# Patient Record
Sex: FEMALE | Race: WHITE | URBAN - METROPOLITAN AREA
[De-identification: names, ages, dates, MRNs, and addresses within clinical notes are randomized per-mention and may not be internally consistent; named-entity substitution may affect disease eponyms.]

---

## 2017-12-31 ENCOUNTER — INPATIENT (INPATIENT)
Facility: HOSPITAL | Age: 82
LOS: 1 days | Discharge: HOME | DRG: 149 | End: 2018-01-02
Attending: INTERNAL MEDICINE | Admitting: INTERNAL MEDICINE
Payer: MEDICARE

## 2017-12-31 VITALS
DIASTOLIC BLOOD PRESSURE: 76 MMHG | OXYGEN SATURATION: 96 % | SYSTOLIC BLOOD PRESSURE: 147 MMHG | TEMPERATURE: 97 F | HEART RATE: 67 BPM | RESPIRATION RATE: 18 BRPM

## 2017-12-31 DIAGNOSIS — Z90.49 ACQUIRED ABSENCE OF OTHER SPECIFIED PARTS OF DIGESTIVE TRACT: Chronic | ICD-10-CM

## 2017-12-31 DIAGNOSIS — R63.8 OTHER SYMPTOMS AND SIGNS CONCERNING FOOD AND FLUID INTAKE: ICD-10-CM

## 2017-12-31 DIAGNOSIS — R42 DIZZINESS AND GIDDINESS: ICD-10-CM

## 2017-12-31 DIAGNOSIS — Z29.9 ENCOUNTER FOR PROPHYLACTIC MEASURES, UNSPECIFIED: ICD-10-CM

## 2017-12-31 DIAGNOSIS — Z98.890 OTHER SPECIFIED POSTPROCEDURAL STATES: Chronic | ICD-10-CM

## 2017-12-31 DIAGNOSIS — I67.1 CEREBRAL ANEURYSM, NONRUPTURED: ICD-10-CM

## 2017-12-31 LAB
ALBUMIN SERPL ELPH-MCNC: 4.4 G/DL — SIGNIFICANT CHANGE UP (ref 3.3–5)
ALP SERPL-CCNC: 49 U/L — SIGNIFICANT CHANGE UP (ref 40–120)
ALT FLD-CCNC: 13 U/L — SIGNIFICANT CHANGE UP (ref 10–45)
ANION GAP SERPL CALC-SCNC: 16 MMOL/L — SIGNIFICANT CHANGE UP (ref 5–17)
APPEARANCE UR: CLEAR — SIGNIFICANT CHANGE UP
APTT BLD: 38.9 SEC — HIGH (ref 27.5–37.4)
AST SERPL-CCNC: 14 U/L — SIGNIFICANT CHANGE UP (ref 10–40)
BACTERIA # UR AUTO: PRESENT /HPF
BASOPHILS NFR BLD AUTO: 0.3 % — SIGNIFICANT CHANGE UP (ref 0–2)
BILIRUB SERPL-MCNC: 1.8 MG/DL — HIGH (ref 0.2–1.2)
BILIRUB UR-MCNC: NEGATIVE — SIGNIFICANT CHANGE UP
BUN SERPL-MCNC: 18 MG/DL — SIGNIFICANT CHANGE UP (ref 7–23)
CALCIUM SERPL-MCNC: 9.8 MG/DL — SIGNIFICANT CHANGE UP (ref 8.4–10.5)
CHLORIDE SERPL-SCNC: 98 MMOL/L — SIGNIFICANT CHANGE UP (ref 96–108)
CHOLEST SERPL-MCNC: 227 MG/DL — HIGH (ref 10–199)
CO2 SERPL-SCNC: 24 MMOL/L — SIGNIFICANT CHANGE UP (ref 22–31)
COLOR SPEC: YELLOW — SIGNIFICANT CHANGE UP
CREAT SERPL-MCNC: 0.72 MG/DL — SIGNIFICANT CHANGE UP (ref 0.5–1.3)
DIFF PNL FLD: (no result)
EOSINOPHIL NFR BLD AUTO: 1.8 % — SIGNIFICANT CHANGE UP (ref 0–6)
EPI CELLS # UR: SIGNIFICANT CHANGE UP /HPF (ref 0–5)
GLUCOSE SERPL-MCNC: 135 MG/DL — HIGH (ref 70–99)
GLUCOSE UR QL: NEGATIVE — SIGNIFICANT CHANGE UP
HCT VFR BLD CALC: 42.5 % — SIGNIFICANT CHANGE UP (ref 34.5–45)
HDLC SERPL-MCNC: 62 MG/DL — SIGNIFICANT CHANGE UP (ref 40–125)
HGB BLD-MCNC: 14.4 G/DL — SIGNIFICANT CHANGE UP (ref 11.5–15.5)
INR BLD: 1.02 — SIGNIFICANT CHANGE UP (ref 0.88–1.16)
KETONES UR-MCNC: 15 MG/DL
LEUKOCYTE ESTERASE UR-ACNC: (no result)
LIPID PNL WITH DIRECT LDL SERPL: 139 MG/DL — HIGH
LYMPHOCYTES # BLD AUTO: 29.5 % — SIGNIFICANT CHANGE UP (ref 13–44)
MCHC RBC-ENTMCNC: 30.3 PG — SIGNIFICANT CHANGE UP (ref 27–34)
MCHC RBC-ENTMCNC: 33.9 G/DL — SIGNIFICANT CHANGE UP (ref 32–36)
MCV RBC AUTO: 89.3 FL — SIGNIFICANT CHANGE UP (ref 80–100)
MONOCYTES NFR BLD AUTO: 5.2 % — SIGNIFICANT CHANGE UP (ref 2–14)
NEUTROPHILS NFR BLD AUTO: 63.2 % — SIGNIFICANT CHANGE UP (ref 43–77)
NITRITE UR-MCNC: NEGATIVE — SIGNIFICANT CHANGE UP
PH UR: 7.5 — SIGNIFICANT CHANGE UP (ref 5–8)
PLATELET # BLD AUTO: 219 K/UL — SIGNIFICANT CHANGE UP (ref 150–400)
POTASSIUM SERPL-MCNC: 3.4 MMOL/L — LOW (ref 3.5–5.3)
POTASSIUM SERPL-SCNC: 3.4 MMOL/L — LOW (ref 3.5–5.3)
PROT SERPL-MCNC: 6.7 G/DL — SIGNIFICANT CHANGE UP (ref 6–8.3)
PROT UR-MCNC: NEGATIVE MG/DL — SIGNIFICANT CHANGE UP
PROTHROM AB SERPL-ACNC: 11.3 SEC — SIGNIFICANT CHANGE UP (ref 9.8–12.7)
RBC # BLD: 4.76 M/UL — SIGNIFICANT CHANGE UP (ref 3.8–5.2)
RBC # FLD: 13.9 % — SIGNIFICANT CHANGE UP (ref 10.3–16.9)
RBC CASTS # UR COMP ASSIST: < 5 /HPF — SIGNIFICANT CHANGE UP
SODIUM SERPL-SCNC: 138 MMOL/L — SIGNIFICANT CHANGE UP (ref 135–145)
SP GR SPEC: 1.01 — SIGNIFICANT CHANGE UP (ref 1–1.03)
TOTAL CHOLESTEROL/HDL RATIO MEASUREMENT: 3.7 RATIO — SIGNIFICANT CHANGE UP (ref 3.3–7.1)
TRIGL SERPL-MCNC: 129 MG/DL — SIGNIFICANT CHANGE UP (ref 10–149)
TROPONIN T SERPL-MCNC: <0.01 NG/ML — SIGNIFICANT CHANGE UP (ref 0–0.01)
UROBILINOGEN FLD QL: 0.2 E.U./DL — SIGNIFICANT CHANGE UP
WBC # BLD: 10.2 K/UL — SIGNIFICANT CHANGE UP (ref 3.8–10.5)
WBC # FLD AUTO: 10.2 K/UL — SIGNIFICANT CHANGE UP (ref 3.8–10.5)
WBC UR QL: < 5 /HPF — SIGNIFICANT CHANGE UP

## 2017-12-31 PROCEDURE — 70496 CT ANGIOGRAPHY HEAD: CPT | Mod: 26

## 2017-12-31 PROCEDURE — 93010 ELECTROCARDIOGRAM REPORT: CPT

## 2017-12-31 PROCEDURE — 99223 1ST HOSP IP/OBS HIGH 75: CPT | Mod: GC

## 2017-12-31 PROCEDURE — 99291 CRITICAL CARE FIRST HOUR: CPT | Mod: 25

## 2017-12-31 PROCEDURE — 70498 CT ANGIOGRAPHY NECK: CPT | Mod: 26

## 2017-12-31 RX ORDER — DIAZEPAM 5 MG
2 TABLET ORAL ONCE
Refills: 0 | Status: DISCONTINUED | OUTPATIENT
Start: 2017-12-31 | End: 2017-12-31

## 2017-12-31 RX ORDER — HEPARIN SODIUM 5000 [USP'U]/ML
5000 INJECTION INTRAVENOUS; SUBCUTANEOUS EVERY 12 HOURS
Refills: 0 | Status: DISCONTINUED | OUTPATIENT
Start: 2017-12-31 | End: 2018-01-02

## 2017-12-31 RX ORDER — DIAZEPAM 5 MG
5 TABLET ORAL ONCE
Refills: 0 | Status: DISCONTINUED | OUTPATIENT
Start: 2017-12-31 | End: 2017-12-31

## 2017-12-31 RX ORDER — ONDANSETRON 8 MG/1
4 TABLET, FILM COATED ORAL ONCE
Refills: 0 | Status: COMPLETED | OUTPATIENT
Start: 2017-12-31 | End: 2017-12-31

## 2017-12-31 RX ORDER — SODIUM CHLORIDE 9 MG/ML
1000 INJECTION INTRAMUSCULAR; INTRAVENOUS; SUBCUTANEOUS ONCE
Refills: 0 | Status: COMPLETED | OUTPATIENT
Start: 2017-12-31 | End: 2017-12-31

## 2017-12-31 RX ORDER — METOCLOPRAMIDE HCL 10 MG
10 TABLET ORAL ONCE
Refills: 0 | Status: COMPLETED | OUTPATIENT
Start: 2017-12-31 | End: 2017-12-31

## 2017-12-31 RX ORDER — MECLIZINE HCL 12.5 MG
25 TABLET ORAL ONCE
Refills: 0 | Status: COMPLETED | OUTPATIENT
Start: 2017-12-31 | End: 2017-12-31

## 2017-12-31 RX ORDER — POTASSIUM CHLORIDE 20 MEQ
20 PACKET (EA) ORAL
Refills: 0 | Status: COMPLETED | OUTPATIENT
Start: 2017-12-31 | End: 2017-12-31

## 2017-12-31 RX ADMIN — Medication 20 MILLIEQUIVALENT(S): at 18:03

## 2017-12-31 RX ADMIN — Medication 5 MILLIGRAM(S): at 14:10

## 2017-12-31 RX ADMIN — Medication 25 MILLIGRAM(S): at 12:32

## 2017-12-31 RX ADMIN — SODIUM CHLORIDE 1000 MILLILITER(S): 9 INJECTION INTRAMUSCULAR; INTRAVENOUS; SUBCUTANEOUS at 11:43

## 2017-12-31 RX ADMIN — ONDANSETRON 4 MILLIGRAM(S): 8 TABLET, FILM COATED ORAL at 11:28

## 2017-12-31 RX ADMIN — Medication 20 MILLIEQUIVALENT(S): at 21:19

## 2017-12-31 RX ADMIN — Medication 10 MILLIGRAM(S): at 11:55

## 2017-12-31 RX ADMIN — Medication 0.5 MILLIGRAM(S): at 12:07

## 2017-12-31 RX ADMIN — HEPARIN SODIUM 5000 UNIT(S): 5000 INJECTION INTRAVENOUS; SUBCUTANEOUS at 18:02

## 2017-12-31 RX ADMIN — Medication 20 MILLIEQUIVALENT(S): at 19:58

## 2017-12-31 NOTE — ED PROVIDER NOTE - MEDICAL DECISION MAKING DETAILS
pt with severe nausea, vomiting, dizziness, headache sudden onset at 10:45 am - code stroke called upon arrival in case of posterior circulation cva/bleed - pt immediately taken to ct - ct head and cta done, stroke workup shows small aneurysm - NS called outpt follow up otherwise negative - pt improved in ED with meclizine and benzos but still not completely resolved - pt also feeling sedated from benzos - will require admission for observation for vertigo and pt with severe nausea, vomiting, dizziness, headache sudden onset at 10:45 am - code stroke called upon arrival in case of posterior circulation cva/bleed - pt immediately taken to ct - ct head and cta done, stroke workup shows small aneurysm - NS called outpt follow up otherwise negative - pt improved in ED with meclizine and benzos but still not completely resolved - pt also feeling sedated from benzos - will require admission for observation for vertigo as she lives by herself and is not safe discharge at this time.

## 2017-12-31 NOTE — H&P ADULT - NSHPPHYSICALEXAM_GEN_ALL_CORE
Constitutional: WDWN resting comfortably in bed; NAD  Head: NC/AT  Eyes: PERRL, EOMI, anicteric sclera  ENT: no nasal discharge; uvula midline, no oropharyngeal erythema or exudates; dry mucous membranes  Neck: supple; no JVD or thyromegaly  Respiratory: CTA B/L; no W/R/R, no retractions  Cardiac: +S1/S2; RRR; no M/R/G  Gastrointestinal: soft, NT/ND; no rebound or guarding; +BSx4  Extremities: WWP, no clubbing or cyanosis; no peripheral edema  Musculoskeletal: NROM x4; no joint swelling, tenderness or erythema  Neurologic: AAOx2-3 to person, place, knows year and month, not sure of exact date; CNII-XII grossly intact; no focal deficits

## 2017-12-31 NOTE — H&P ADULT - PROBLEM SELECTOR PLAN 2
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  Neurosurgery recommended outpatient follow up.

## 2017-12-31 NOTE — ED ADULT NURSE REASSESSMENT NOTE - GENERAL PATIENT STATE
comfortable appearance/resting/sleeping

## 2017-12-31 NOTE — ED ADULT NURSE NOTE - OBJECTIVE STATEMENT
BIBA from home c/o sudden onset of dizziness accompanied with n/v, left sided headache and photophobia that started ~ 10:45. Pt is actively vomiting in triage. Pt denies chest pain, sob, weakness.

## 2017-12-31 NOTE — ED PROVIDER NOTE - OBJECTIVE STATEMENT
81 y/o f with PMH of migraines presents to ED with sudden onset of dizziness, vertigo, nausea, vomiting starting at 10:45 am.  Pt states she woke up feeling normal.  Denies similar severe symptoms in past.  Complains of mild frontal headache.  No focal extremity weakness.  No numbness.  No trauma.

## 2017-12-31 NOTE — H&P ADULT - HISTORY OF PRESENT ILLNESS
Patient is an 82 year old female who denies any significant PMH presenting to [facility] for acute onset lightheadedness, dizziness and nausea.  The patient woke up this morning in her usual state of health and between 10-11, the patient was urinating, felt so lightheaded that she could not even stay on the toilet bowl.  Additionally, she started to have NBNB emesis (does not remember how many episdoes) and loss of balance.  She called her downstairs neightbor who called EMS.    When she arrived in the ED, vital signs were 147/76, HR was 67, RR was 18 temperature was 97.4 degrees and she was saturating 96% on room air.  At this time the patient was experiencing photophobia, headache, vomiting and Patient is an 82 year old female with PMH vertigo and migraines presenting to [facility] for acute onset lightheadedness, dizziness, nausea and vomiting.  The patient woke up this morning in her usual state of health and between 10-11, the patient was urinating, felt so lightheaded that she could not even stay on the toilet bowl.  Additionally, she started to have NBNB emesis (does not remember how many episdoes) and loss of balance.  She called her downstairs neightbor who called EMS.    When she arrived in the ED, vital signs were 147/76, HR was 67, RR was 18 temperature was 97.4 degrees and she was saturating 96% on room air.  Stroke code was called.  NIHSS of 0.  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  Neurosurgery recommended outpatient follow up.  Patient was experiencing photophobia, headache, vomiting in the ED with associated horizontal nystagmus on exam.  She received IV zofran 4mg, IV reglan 10mg, meclizine PO 25mg, ativan 0.5mg IV and valium 5mg PO.  Patient's symptoms resolved but she was admitted for observation as she was sedated and lives alone and she could not safely be discharged. Patient is an 82 year old female with PMH vertigo and migraines presenting to [facility] for acute onset lightheadedness, dizziness, nausea and vomiting.  The patient woke up this morning in her usual state of health and between 10-11AM, the patient was urinating and felt so lightheaded that she could not even stay on the toilet bowl.  Additionally, she started to have NBNB emesis (does not remember how many episdoes) and loss of balance.  She called her downstairs neightbor who called EMS.    When she arrived in the ED, vital signs were 147/76, HR was 67, RR was 18 temperature was 97.4 degrees and she was saturating 96% on room air.  Stroke code was called.  NIHSS of 0.  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  Neurosurgery recommended outpatient follow up.  Patient was experiencing photophobia, headache, vomiting in the ED with associated horizontal nystagmus on exam.  She received IV zofran 4mg, IV reglan 10mg, meclizine PO 25mg, ativan 0.5mg IV and valium 5mg PO.  Patient's symptoms resolved but she was admitted for observation as she was sedated and lives alone and she could not safely be discharged.

## 2017-12-31 NOTE — H&P ADULT - ASSESSMENT
Patient is an 82 year old female with PMH vertigo and migraines presenting to [facility] for acute onset lightheadedness, dizziness, nausea and vomiting likely 2/2 vertigo.

## 2017-12-31 NOTE — H&P ADULT - NSHPLABSRESULTS_GEN_ALL_CORE
O/N Events:  Subjective:    VITALS  LABS                        14.4   10.2  )-----------( 219      ( 31 Dec 2017 11:33 )             42.5         138  |  98  |  18  ----------------------------<  135<H>  3.4<L>   |  24  |  0.72    Ca    9.8      31 Dec 2017 11:33    TPro  6.7  /  Alb  4.4  /  TBili  1.8<H>  /  DBili  x   /  AST  14  /  ALT  13  /  AlkPhos  49      LIVER FUNCTIONS - ( 31 Dec 2017 11:33 )  Alb: 4.4 g/dL / Pro: 6.7 g/dL / ALK PHOS: 49 U/L / ALT: 13 U/L / AST: 14 U/L / GGT: x           PT/INR - ( 31 Dec 2017 11:33 )   PT: 11.3 sec;   INR: 1.02          PTT - ( 31 Dec 2017 11:33 )  PTT:38.9 sec  Urinalysis Basic - ( 31 Dec 2017 12:48 )    Color: Yellow / Appearance: Clear / S.010 / pH: x  Gluc: x / Ketone: 15 mg/dL  / Bili: Negative / Urobili: 0.2 E.U./dL   Blood: x / Protein: NEGATIVE mg/dL / Nitrite: NEGATIVE   Leuk Esterase: Trace / RBC: < 5 /HPF / WBC < 5 /HPF   Sq Epi: x / Non Sq Epi: 0-5 /HPF / Bacteria: Present /HPF      CARDIAC MARKERS ( 31 Dec 2017 11:33 )  x     / <0.01 ng/mL / x     / x     / x

## 2017-12-31 NOTE — ED PROVIDER NOTE - CRITICAL CARE PROVIDED
direct patient care (not related to procedure)/additional history taking/interpretation of diagnostic studies/documentation/consultation with other physicians/conducted a detailed discussion of DNR status/consult w/ pt's family directly relating to pts condition/telephone consultation with the patient's family

## 2017-12-31 NOTE — ED ADULT TRIAGE NOTE - CHIEF COMPLAINT QUOTE
patient BIBA from home complains of dizziness and vomiting while reading. denies chest pain, fever, chills. denies medical problems. no arm drift, no facial droop,

## 2017-12-31 NOTE — H&P ADULT - PROBLEM SELECTOR PLAN 1
-Patient with history of migraines and vertigo presented with acute onset lightheadedness, dizziness, nausea and vomiting.  Stroke code called and workup negative to date.  Received IV zofran 4mg, IV reglan 10mg, meclizine PO 25mg, ativan 0.5mg IV and valium 5mg PO in the ED.  -Continue to monitor symptoms and treat as needed-would avoid benzos if possible as patient was previously sedated and now mildly confused  -MRI as outpatient per neuro recs  -Follow up TSH, lipid profile and HbA1C  -Collateral from outpatient PCP (possibly Dr. Abhijeet Lacy) -Patient with history of migraines and vertigo presented with acute onset lightheadedness, dizziness, nausea and vomiting.  Stroke code called and workup negative to date.  Received IV zofran 4mg, IV reglan 10mg, meclizine PO 25mg, ativan 0.5mg IV and valium 5mg PO in the ED.  -Continue to monitor symptoms and treat as needed-would avoid benzos if possible as patient was previously sedated and now mildly confused  -MRI as outpatient per neuro recs  -Follow up TSH, lipid profile and HbA1C  -Collateral from outpatient PCP (possibly Dr. Abhijeet Lacy) and friend Marine ([phone])

## 2017-12-31 NOTE — CONSULT NOTE ADULT - SUBJECTIVE AND OBJECTIVE BOX
**STROKE CODE CONSULT NOTE**    Last known well time/Time of onset of symptoms: 10:45am    HPI: Patient is an 81 yo F with reported PMHx of migraines, vertigo, intolerance to aricept, no other issues who presents with acute onset of dizziness, nausea and vomiting Patient was in usual state of health, reading, when she had sudden onset of nausea, dizziness- described as lightheadedness and not the room spinning, and vomiting with mild headache, much worse than her usual vertigo or migraine headache symptoms. Patient presented to the ED, stroke code was called as per stroke note, NIHSS 0,     PAST MEDICAL & SURGICAL HISTORY:  Vertigo  Migraine  H/O cosmetic surgery (botox)  History of appendectomy      FAMILY HISTORY:  No significant history    SOCIAL HISTORY:  Denies EtOH, Tobacco, or IVDU    ROS:  Constitutional: No fever, weight loss or fatigue  Eyes: No eye pain, visual disturbances, or discharge  ENMT:  No difficulty hearing, tinnitus; No sinus or throat pain. Vertigo as in HPI  Neck: No pain or stiffness  Respiratory: No cough, wheezing, chills or hemoptysis  Cardiovascular: No chest pain, palpitations, shortness of breath, dizziness or leg swelling  Gastrointestinal: No abdominal pain. Nonbilious, non bloody emesis. No diarrhea or constipation. No hematochezia.  Genitourinary: No dysuria, frequency, hematuria or incontinence  Neurological: As per HPI  Skin: No itching, burning, rashes or lesions   Endocrine: No heat or cold intolerance; No hair loss  Musculoskeletal: No joint pain or swelling; No muscle, back or extremity pain  Psychiatric: No depression, anxiety, mood swings or difficulty sleeping. Questionable history of dementia given history of aricept allergy  Heme/Lymph: No easy bruising or bleeding gums    MEDICATIONS  (STANDING):    MEDICATIONS  (PRN):      Allergies    No Known Allergies    Intolerances        Vital Signs Last 24 Hrs  T(C): 35.9 (31 Dec 2017 15:02), Max: 36.3 (31 Dec 2017 11:17)  T(F): 96.6 (31 Dec 2017 15:02), Max: 97.4 (31 Dec 2017 11:17)  HR: 71 (31 Dec 2017 15:02) (65 - 75)  BP: 145/65 (31 Dec 2017 15:02) (138/82 - 166/84)  BP(mean): --  RR: 18 (31 Dec 2017 15:02) (18 - 18)  SpO2: 96% (31 Dec 2017 15:02) (92% - 100%)    PHYSICAL EXAM:  Constitutional: WDWN; NAD  Cardiovascular: RRR, no appreciable murmurs; no carotid bruits  Neurologic:  Mental status: Awake, alert and oriented x3.  Recent and remote memory intact.  Naming, repetition and comprehension intact.  Attention/concentration intact.  No dysarthria, no aphasia.  Fund of knowledge appropriate.    Cranial nerves: Pupils equally round and reactive to light, visual fields full, horizontal nystagmus, extraocular muscles intact, V1 through V3 intact bilaterally and symmetric, face symmetric, hearing intact to finger rub, palate elevation symmetric, tongue was midline, sternocleidomastoid/shoulder shrug strength bilaterally 5/5.    Motor:  Normal bulk and tone, strength 5/5 in bilateral upper and lower extremities.   strength 5/5.  Rapid alternating movements intact and symmetric.   Sensation: Intact to light touch, proprioception, and pinprick.  No neglect.   Coordination: No dysmetria on finger-to-nose and heel-to-shin.  No clumsiness.  Reflexes: 2+ in upper and lower extremities, downgoing toes bilaterally  Gait: Not assessed    NIHSS: 0    Fingerstick Blood Glucose: CAPILLARY BLOOD GLUCOSE  116 (31 Dec 2017 14:16)      POCT Blood Glucose.: 115 mg/dL (31 Dec 2017 11:17)       LABS:                        14.4   10.2  )-----------( 219      ( 31 Dec 2017 11:33 )             42.5     12    138  |  98  |  18  ----------------------------<  135<H>  3.4<L>   |  24  |  0.72    Ca    9.8      31 Dec 2017 11:33    TPro  6.7  /  Alb  4.4  /  TBili  1.8<H>  /  DBili  x   /  AST  14  /  ALT  13  /  AlkPhos  49  12    PT/INR - ( 31 Dec 2017 11:33 )   PT: 11.3 sec;   INR: 1.02          PTT - ( 31 Dec 2017 11:33 )  PTT:38.9 sec  CARDIAC MARKERS ( 31 Dec 2017 11:33 )  x     / <0.01 ng/mL / x     / x     / x          Urinalysis Basic - ( 31 Dec 2017 12:48 )    Color: Yellow / Appearance: Clear / S.010 / pH: x  Gluc: x / Ketone: 15 mg/dL  / Bili: Negative / Urobili: 0.2 E.U./dL   Blood: x / Protein: NEGATIVE mg/dL / Nitrite: NEGATIVE   Leuk Esterase: Trace / RBC: < 5 /HPF / WBC < 5 /HPF   Sq Epi: x / Non Sq Epi: 0-5 /HPF / Bacteria: Present /HPF        RADIOLOGY & ADDITIONAL STUDIES:    < from: CT Brain Stroke Protocol (17 @ 11:37) >  No acute intracranial hemorrhage or CT evidence of acute transcortical   infarction. 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      < end of copied text >  < from: CT Angio Head w/ IV Cont (17 @ 11:59) >  Impression: Findings are suspicious for a tiny anterior communicating   artery aneurysm.        < end of copied text >  < from: CT Angio Head w/ IV Cont (17 @ 11:59) >  Impression: Normal CTA of the extracranial circulation. No evidence of   carotid stenosis.    Approximately 1.5 cm heterogeneous nodular focus in the right lobe of the   thyroid gland. If there is further clinical concern, would recommend   ultrasound for further characterization.    < end of copied text >    IV-tPA (Y/N):                                   Bolus time:  Reason IV-tPA not given:    ASSESSMENT/PLAN: **STROKE CODE CONSULT NOTE**    Last known well time/Time of onset of symptoms: 10:45am    HPI: Patient is an 81 yo F with reported PMHx of migraines, vertigo, intolerance to aricept, no other issues who presents with acute onset of dizziness, nausea and vomiting Patient was in usual state of health, reading, when she had sudden onset of nausea, dizziness- described as lightheadedness and not the room spinning, and vomiting with mild headache, much worse than her usual vertigo or migraine headache symptoms. Patient presented to the ED, stroke code was called as per stroke note, NIHSS 0,     PAST MEDICAL & SURGICAL HISTORY:  Vertigo  Migraine  H/O cosmetic surgery (botox)  History of appendectomy      FAMILY HISTORY:  No significant history    SOCIAL HISTORY:  Denies EtOH, Tobacco, or IVDU    ROS:  Constitutional: No fever, weight loss or fatigue  Eyes: No eye pain, visual disturbances, or discharge  ENMT:  No difficulty hearing, tinnitus; No sinus or throat pain. Vertigo as in HPI  Neck: No pain or stiffness  Respiratory: No cough, wheezing, chills or hemoptysis  Cardiovascular: No chest pain, palpitations, shortness of breath, dizziness or leg swelling  Gastrointestinal: No abdominal pain. Nonbilious, non bloody emesis. No diarrhea or constipation. No hematochezia.  Genitourinary: No dysuria, frequency, hematuria or incontinence  Neurological: As per HPI  Skin: No itching, burning, rashes or lesions   Endocrine: No heat or cold intolerance; No hair loss  Musculoskeletal: No joint pain or swelling; No muscle, back or extremity pain  Psychiatric: No depression, anxiety, mood swings or difficulty sleeping. Questionable history of dementia given history of aricept allergy  Heme/Lymph: No easy bruising or bleeding gums    MEDICATIONS  (STANDING):    MEDICATIONS  (PRN):      Allergies    No Known Allergies    Intolerances        Vital Signs Last 24 Hrs  T(C): 35.9 (31 Dec 2017 15:02), Max: 36.3 (31 Dec 2017 11:17)  T(F): 96.6 (31 Dec 2017 15:02), Max: 97.4 (31 Dec 2017 11:17)  HR: 71 (31 Dec 2017 15:02) (65 - 75)  BP: 145/65 (31 Dec 2017 15:02) (138/82 - 166/84)  BP(mean): --  RR: 18 (31 Dec 2017 15:02) (18 - 18)  SpO2: 96% (31 Dec 2017 15:02) (92% - 100%)    PHYSICAL EXAM:  Constitutional: WDWN; NAD  Cardiovascular: RRR, no appreciable murmurs; no carotid bruits  Neurologic:  Mental status: Awake, alert and oriented x3.  Recent and remote memory intact.  Naming, repetition and comprehension intact.  Attention/concentration intact.  No dysarthria, no aphasia.  Fund of knowledge appropriate.    Cranial nerves: Pupils equally round and reactive to light, visual fields full, horizontal nystagmus, extraocular muscles intact, V1 through V3 intact bilaterally and symmetric, face symmetric, hearing intact to finger rub, palate elevation symmetric, tongue was midline, sternocleidomastoid/shoulder shrug strength bilaterally 5/5.    Motor:  Normal bulk and tone, strength 5/5 in bilateral upper and lower extremities.   strength 5/5.  Rapid alternating movements intact and symmetric.   Sensation: Intact to light touch, proprioception, and pinprick.  No neglect.   Coordination: No dysmetria on finger-to-nose and heel-to-shin.  No clumsiness.  Reflexes: 2+ in upper and lower extremities, downgoing toes bilaterally  Gait: Not assessed    NIHSS: 0    Fingerstick Blood Glucose: CAPILLARY BLOOD GLUCOSE  116 (31 Dec 2017 14:16)      POCT Blood Glucose.: 115 mg/dL (31 Dec 2017 11:17)       LABS:                        14.4   10.2  )-----------( 219      ( 31 Dec 2017 11:33 )             42.5     12    138  |  98  |  18  ----------------------------<  135<H>  3.4<L>   |  24  |  0.72    Ca    9.8      31 Dec 2017 11:33    TPro  6.7  /  Alb  4.4  /  TBili  1.8<H>  /  DBili  x   /  AST  14  /  ALT  13  /  AlkPhos  49  12    PT/INR - ( 31 Dec 2017 11:33 )   PT: 11.3 sec;   INR: 1.02          PTT - ( 31 Dec 2017 11:33 )  PTT:38.9 sec  CARDIAC MARKERS ( 31 Dec 2017 11:33 )  x     / <0.01 ng/mL / x     / x     / x          Urinalysis Basic - ( 31 Dec 2017 12:48 )    Color: Yellow / Appearance: Clear / S.010 / pH: x  Gluc: x / Ketone: 15 mg/dL  / Bili: Negative / Urobili: 0.2 E.U./dL   Blood: x / Protein: NEGATIVE mg/dL / Nitrite: NEGATIVE   Leuk Esterase: Trace / RBC: < 5 /HPF / WBC < 5 /HPF   Sq Epi: x / Non Sq Epi: 0-5 /HPF / Bacteria: Present /HPF        RADIOLOGY & ADDITIONAL STUDIES:    < from: CT Brain Stroke Protocol (17 @ 11:37) >  No acute intracranial hemorrhage or CT evidence of acute transcortical   infarction. 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      < end of copied text >  < from: CT Angio Head w/ IV Cont (17 @ 11:59) >  Impression: Findings are suspicious for a tiny anterior communicating   artery aneurysm.        < end of copied text >  < from: CT Angio Head w/ IV Cont (17 @ 11:59) >  Impression: Normal CTA of the extracranial circulation. No evidence of   carotid stenosis.    Approximately 1.5 cm heterogeneous nodular focus in the right lobe of the   thyroid gland. If there is further clinical concern, would recommend   ultrasound for further characterization.    < end of copied text >    IV-tPA (Y/N):         N                          Bolus time:  Reason IV-tPA not given: low NIHSS, no signs of ischemia    ASSESSMENT/PLAN:  #Stroke code:  Patient with NIHSS 0, symptom improvement with treatment of peripheral vertigo given horizontal nystagmus.   - MRI as an outpatient to further assess if stroke, no tPA as above  - If staying, would suggest lipid panel, TSH, and HgbA1C to assess stroke risk  - If staying, would suggest contacting primary care provider for collateral    #Dizziness:  Patient improved with treatment as per ED, continue treatment as per ED    #small TERESA aneurysm  - Outpatient followup with Dr. Rodriguez or Dr. Oviedo of neurointerventional radiology    Other care as per ED    Patient seen and discussed bedside with Dr. Betancur

## 2017-12-31 NOTE — ED PROVIDER NOTE - NEUROLOGICAL, MLM
AAO x 3, no facial droop or slurred speech, horizontal nystagmus, strength 5/5 b/l upper and lower ext

## 2017-12-31 NOTE — ED ADULT NURSE NOTE - CHPI ED SYMPTOMS NEG
no blurred vision/no confusion/no loss of consciousness/no numbness/no weakness/no change in level of consciousness

## 2018-01-01 LAB
ANION GAP SERPL CALC-SCNC: 11 MMOL/L — SIGNIFICANT CHANGE UP (ref 5–17)
BUN SERPL-MCNC: 16 MG/DL — SIGNIFICANT CHANGE UP (ref 7–23)
CALCIUM SERPL-MCNC: 9.5 MG/DL — SIGNIFICANT CHANGE UP (ref 8.4–10.5)
CHLORIDE SERPL-SCNC: 106 MMOL/L — SIGNIFICANT CHANGE UP (ref 96–108)
CO2 SERPL-SCNC: 27 MMOL/L — SIGNIFICANT CHANGE UP (ref 22–31)
CREAT SERPL-MCNC: 0.7 MG/DL — SIGNIFICANT CHANGE UP (ref 0.5–1.3)
GLUCOSE SERPL-MCNC: 97 MG/DL — SIGNIFICANT CHANGE UP (ref 70–99)
HBA1C BLD-MCNC: 5.2 % — SIGNIFICANT CHANGE UP (ref 4–5.6)
HCT VFR BLD CALC: 44.3 % — SIGNIFICANT CHANGE UP (ref 34.5–45)
HGB BLD-MCNC: 14.5 G/DL — SIGNIFICANT CHANGE UP (ref 11.5–15.5)
MAGNESIUM SERPL-MCNC: 2.1 MG/DL — SIGNIFICANT CHANGE UP (ref 1.6–2.6)
MCHC RBC-ENTMCNC: 29.4 PG — SIGNIFICANT CHANGE UP (ref 27–34)
MCHC RBC-ENTMCNC: 32.7 G/DL — SIGNIFICANT CHANGE UP (ref 32–36)
MCV RBC AUTO: 89.9 FL — SIGNIFICANT CHANGE UP (ref 80–100)
PLATELET # BLD AUTO: 216 K/UL — SIGNIFICANT CHANGE UP (ref 150–400)
POTASSIUM SERPL-MCNC: 4 MMOL/L — SIGNIFICANT CHANGE UP (ref 3.5–5.3)
POTASSIUM SERPL-SCNC: 4 MMOL/L — SIGNIFICANT CHANGE UP (ref 3.5–5.3)
RBC # BLD: 4.93 M/UL — SIGNIFICANT CHANGE UP (ref 3.8–5.2)
RBC # FLD: 14 % — SIGNIFICANT CHANGE UP (ref 10.3–16.9)
SODIUM SERPL-SCNC: 144 MMOL/L — SIGNIFICANT CHANGE UP (ref 135–145)
TSH SERPL-MCNC: 1.71 UIU/ML — SIGNIFICANT CHANGE UP (ref 0.35–4.94)
WBC # BLD: 9.2 K/UL — SIGNIFICANT CHANGE UP (ref 3.8–10.5)
WBC # FLD AUTO: 9.2 K/UL — SIGNIFICANT CHANGE UP (ref 3.8–10.5)

## 2018-01-01 PROCEDURE — 99233 SBSQ HOSP IP/OBS HIGH 50: CPT

## 2018-01-01 RX ADMIN — HEPARIN SODIUM 5000 UNIT(S): 5000 INJECTION INTRAVENOUS; SUBCUTANEOUS at 05:19

## 2018-01-01 NOTE — PROGRESS NOTE ADULT - ASSESSMENT
Patient is an 82 year old female with PMH vertigo and migraines presenting to [facility] for acute onset lightheadedness, dizziness, nausea and vomiting likely 2/2 vertigo.

## 2018-01-01 NOTE — PHYSICAL THERAPY INITIAL EVALUATION ADULT - PERTINENT HX OF CURRENT PROBLEM, REHAB EVAL
Patient is an 82 year old female with PMH vertigo and migraines presenting to [facility] for acute onset lightheadedness, dizziness, nausea and vomiting.  The patient woke up this morning in her usual state of health and between 10-11AM, the patient was urinating and felt so lightheaded that she could not even stay on the toilet bowl.  Additionally, she started to have NBNB emesis (does not remember how many episdoes) and loss of balance.

## 2018-01-01 NOTE — DISCHARGE NOTE ADULT - PATIENT PORTAL LINK FT
“You can access the Follow[facility] Patient Portal, offered by [facility], by registering with the following website: [email or web address]”

## 2018-01-01 NOTE — PHYSICAL THERAPY INITIAL EVALUATION ADULT - ADDITIONAL COMMENTS
Patient lives in elevator apartment, no steps to enter. Patient denies home health assistance, DME, or history of falls.

## 2018-01-01 NOTE — DISCHARGE NOTE ADULT - CARE PLAN
Principal Discharge DX:	Vertigo  Goal:	For dizziness to stay resolved.  Instructions for follow-up, activity and diet:	You were admitted for dizziness and nausea/ vomiting which resolved completely with time and medications to help with your symptoms. A stroke work-up was performed which was negative. Please follow up with your primary care provider for further management.  Secondary Diagnosis:	Cerebral aneurysm  Instructions for follow-up, activity and diet:	You had imaging done of your brain done that showed that you may have a very small cerebral aneurysm (outpouching of the vessel). Neurosurgery was consulted and would like you to follow up with them as an outpatient.  Secondary Diagnosis:	Health maintenance examination  Instructions for follow-up, activity and diet:	Imaging of your neck incidentally found a 1.5cm nodule in the right side of your thyroid, please follow up with your primary care doctor for further management. Principal Discharge DX:	Vertigo  Goal:	For dizziness to stay resolved.  Instructions for follow-up, activity and diet:	You were admitted for dizziness and nausea/ vomiting which resolved completely with time and medications to help with your symptoms. A stroke work-up was performed which was negative. Please follow up with your primary care provider for further management.  Secondary Diagnosis:	Cerebral aneurysm  Instructions for follow-up, activity and diet:	You had imaging done of your brain done that showed that you may have a very small cerebral aneurysm (outpouching of the vessel). Neurosurgery was consulted and would like you to follow up as an outpatient.  Secondary Diagnosis:	Health maintenance examination  Instructions for follow-up, activity and diet:	Imaging of your neck incidentally found a 1.5cm nodule in the right side of your thyroid, please follow up with your primary care doctor for further management.

## 2018-01-01 NOTE — DISCHARGE NOTE ADULT - CARE PROVIDER_API CALL
Ronaldo Rodriguez), Neurology; Vascular Neurology  [address] [address]  Phone: [phone]  Fax: [phone]    Doroteo Oviedo), Neurological Surgery  [address] [address]  Phone: [phone]  Fax: [phone]

## 2018-01-01 NOTE — PROGRESS NOTE ADULT - PROBLEM SELECTOR PLAN 4
-No IVF indicated  -Will replete to K>4 and Mg>2  -DASH/TLC diet  -Full Code  -Dispo home pending clinical improvement and SW evaluation

## 2018-01-01 NOTE — PROGRESS NOTE ADULT - PROBLEM SELECTOR PLAN 1
Patient with history of migraines and vertigo presented with acute onset lightheadedness, dizziness, nausea and vomiting.  Stroke code called and workup negative.  Received IV zofran 4mg, IV reglan 10mg, meclizine PO 25mg, ativan 0.5mg IV and valium 5mg PO in the ED. Patient now denying this occurred, reports she was brought in because co-op is trying to get her out of her apartment. No complaints currently  -Continue to monitor symptoms and treat as needed-would avoid benzos if possible as patient was previously sedated and now mildly confused  -MRI as outpatient per neuro recs  -Follow up TSH, lipid profile and HbA1C  -Collateral from outpatient PCP (possibly Dr. Abhijeet Lacy) when office opens on Monday and friend Marine ([phone])

## 2018-01-01 NOTE — PROGRESS NOTE ADULT - SUBJECTIVE AND OBJECTIVE BOX
OVERNIGHT EVENTS: ADELAIDA     SUBJECTIVE / INTERVAL HPI: Patient seen and examined at bedside. Patient now denying any symptoms prior to coming to the hospital, stating that her co-op called EMS because they are trying to get her out of her apartment building so they can sell it. Currently she denies any dizziness, lightheadedness, n/v, CP, SOB, abdominal pain, numbness/ tingling or weakness.     VITAL SIGNS:  Vital Signs Last 24 Hrs  T(C): 36.6 (2018 08:28), Max: 36.8 (31 Dec 2017 17:03)  T(F): 97.9 (2018 08:28), Max: 98.2 (31 Dec 2017 17:03)  HR: 979 (2018 08:28) (70 - 979)  BP: 155/65 (2018 08:28) (114/68 - 155/65)  BP(mean): --  RR: 17 (2018 08:28) (16 - 20)  SpO2: 98% (2018 08:28) (96% - 98%)    PHYSICAL EXAM:    General: Elderly F, appears younger than stated age. NAD  HEENT: NC/AT; PERRL, anicteric sclera; MMM  Neck: supple  Cardiovascular: +S1/S2; RRR, no m/r/g  Respiratory: CTA B/L; no W/R/R  Gastrointestinal: soft, NT/ND;   Extremities: WWP; no edema or cyanosis  Neurological: AAOx1; (oriented to person)  CN: II-XII intact  Motor: 5/5 strength throughout  Sensation: Intact to light touch througout  Coordination: No dysmetria on finger to nose.     MEDICATIONS:  MEDICATIONS  (STANDING):  heparin  Injectable 5000 Unit(s) SubCutaneous every 12 hours    MEDICATIONS  (PRN):      ALLERGIES:  Allergies    No Known Allergies    Intolerances        LABS:                        14.5   9.2   )-----------( 216      ( 2018 08:17 )             44.3         144  |  106  |  16  ----------------------------<  97  4.0   |  27  |  0.70    Ca    9.5      2018 08:17  Mg     2.1         TPro  6.7  /  Alb  4.4  /  TBili  1.8<H>  /  DBili  x   /  AST  14  /  ALT  13  /  AlkPhos  49  12-31    PT/INR - ( 31 Dec 2017 11:33 )   PT: 11.3 sec;   INR: 1.02          PTT - ( 31 Dec 2017 11:33 )  PTT:38.9 sec  Urinalysis Basic - ( 31 Dec 2017 12:48 )    Color: Yellow / Appearance: Clear / S.010 / pH: x  Gluc: x / Ketone: 15 mg/dL  / Bili: Negative / Urobili: 0.2 E.U./dL   Blood: x / Protein: NEGATIVE mg/dL / Nitrite: NEGATIVE   Leuk Esterase: Trace / RBC: < 5 /HPF / WBC < 5 /HPF   Sq Epi: x / Non Sq Epi: 0-5 /HPF / Bacteria: Present /HPF      CAPILLARY BLOOD GLUCOSE  116 (31 Dec 2017 14:16)      POCT Blood Glucose.: 115 mg/dL (31 Dec 2017 11:17)      RADIOLOGY & ADDITIONAL TESTS:     < from: CT Brain Stroke Protocol (17 @ 11:37) >  No acute intracranial hemorrhage or CT evidence of acute transcortical   infarction. 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    < end of copied text >    < from: CT Angio Head w/ IV Cont (17 @ 11:59) >    Impression: Normal CTA of the extracranial circulation. No evidence of   carotid stenosis.    Approximately 1.5 cm heterogeneous nodular focus in the right lobe of the   thyroid gland. If there is further clinical concern, would recommend   ultrasound for further characterization.    < end of copied text >

## 2018-01-01 NOTE — DISCHARGE NOTE ADULT - CARE PROVIDERS DIRECT ADDRESSES
[email or web address]

## 2018-01-01 NOTE — DISCHARGE NOTE ADULT - PLAN OF CARE
Imaging of your neck incidentally found a 1.5cm nodule in the right side of your thyroid, please follow up with your primary care doctor for further management. 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 Neurosurgery was consulted and would like you to follow up with them as an outpatient. You were admitted for dizziness and nausea/ vomiting which resolved completely with time and medications to help with your symptoms. A stroke work-up was performed which was negative. Please follow up with your primary care provider for further management. For dizziness to stay resolved. 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 Neurosurgery was consulted and would like you to follow up as an outpatient.

## 2018-01-01 NOTE — DISCHARGE NOTE ADULT - HOSPITAL COURSE
82 year old female with PMH vertigo and migraines presenting to [facility] for acute onset lightheadedness, dizziness, nausea and vomiting.  Per report on admission, she has called her downstairs neighbor who then called EMS to bring her to the ED. 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 Neurosurgery consulted who recommended outpatient follow up. In the ED, the patient was experiencing photophobia, headache, and vomiting with associated horizontal nystagmus on exam.  She received IV zofran 4mg, IV reglan 10mg, meclizine PO 25mg, ativan 0.5mg IV and valium 5mg PO.  Patient's symptoms resolved but she was admitted for observation as she was sedated and lives alone and she could not safely be discharged. In the AM, the patient refused having any of the reported symptoms yesterday, stating that she believed she was brought in because her co-op building was trying to get her out of her apartment. She denied any symptoms currently and had no focal deficits on exam. She was evaluated by both SW and PT. She was discharged home in stable condition. 82 year old female with PMH vertigo and migraines presenting to [facility] for acute onset lightheadedness, dizziness, nausea and vomiting.  Per report on admission, she has called her downstairs neighbor who then called EMS to bring her to the ED. 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 Neurosurgery consulted who recommended outpatient follow up. In the ED, the patient was experiencing photophobia, headache, and vomiting with associated horizontal nystagmus on exam.  She received IV zofran 4mg, IV reglan 10mg, meclizine PO 25mg, ativan 0.5mg IV and valium 5mg PO.  Patient's symptoms resolved but she was admitted for observation as she was sedated and lives alone and she could not safely be discharged. In the AM, the patient refused having any of the reported symptoms yesterday, stating that she believed she was brought in because her co-op building was trying to get her out of her apartment. She denied any symptoms currently and had no focal deficits on exam. She was evaluated by both SW and PT. Her friend was able to set up private pay 24 hour care prior to discharge. She was discharged home in stable condition.

## 2018-01-02 VITALS
OXYGEN SATURATION: 99 % | RESPIRATION RATE: 17 BRPM | TEMPERATURE: 98 F | SYSTOLIC BLOOD PRESSURE: 139 MMHG | DIASTOLIC BLOOD PRESSURE: 79 MMHG | HEART RATE: 80 BPM

## 2018-01-02 PROCEDURE — 99239 HOSP IP/OBS DSCHRG MGMT >30: CPT

## 2018-01-05 ENCOUNTER — EMERGENCY (EMERGENCY)
Facility: HOSPITAL | Age: 83
LOS: 1 days | Discharge: HOME | End: 2018-01-05
Attending: EMERGENCY MEDICINE | Admitting: EMERGENCY MEDICINE
Payer: MEDICARE

## 2018-01-05 VITALS
HEIGHT: 67 IN | RESPIRATION RATE: 18 BRPM | WEIGHT: 133.38 LBS | HEART RATE: 93 BPM | OXYGEN SATURATION: 98 % | SYSTOLIC BLOOD PRESSURE: 148 MMHG | TEMPERATURE: 97 F | DIASTOLIC BLOOD PRESSURE: 88 MMHG

## 2018-01-05 DIAGNOSIS — G43.909 MIGRAINE, UNSPECIFIED, NOT INTRACTABLE, WITHOUT STATUS MIGRAINOSUS: ICD-10-CM

## 2018-01-05 DIAGNOSIS — Z98.890 OTHER SPECIFIED POSTPROCEDURAL STATES: Chronic | ICD-10-CM

## 2018-01-05 DIAGNOSIS — I67.1 CEREBRAL ANEURYSM, NONRUPTURED: ICD-10-CM

## 2018-01-05 DIAGNOSIS — F03.90 UNSPECIFIED DEMENTIA WITHOUT BEHAVIORAL DISTURBANCE: ICD-10-CM

## 2018-01-05 DIAGNOSIS — E04.1 NONTOXIC SINGLE THYROID NODULE: ICD-10-CM

## 2018-01-05 DIAGNOSIS — R42 DIZZINESS AND GIDDINESS: ICD-10-CM

## 2018-01-05 DIAGNOSIS — R41.82 ALTERED MENTAL STATUS, UNSPECIFIED: ICD-10-CM

## 2018-01-05 DIAGNOSIS — N39.0 URINARY TRACT INFECTION, SITE NOT SPECIFIED: ICD-10-CM

## 2018-01-05 DIAGNOSIS — Z90.49 ACQUIRED ABSENCE OF OTHER SPECIFIED PARTS OF DIGESTIVE TRACT: Chronic | ICD-10-CM

## 2018-01-05 LAB
ALBUMIN SERPL ELPH-MCNC: 4.5 G/DL — SIGNIFICANT CHANGE UP (ref 3.3–5)
ALP SERPL-CCNC: 45 U/L — SIGNIFICANT CHANGE UP (ref 40–120)
ALT FLD-CCNC: 15 U/L — SIGNIFICANT CHANGE UP (ref 10–45)
ANION GAP SERPL CALC-SCNC: 13 MMOL/L — SIGNIFICANT CHANGE UP (ref 5–17)
AST SERPL-CCNC: 18 U/L — SIGNIFICANT CHANGE UP (ref 10–40)
BASOPHILS NFR BLD AUTO: 0.3 % — SIGNIFICANT CHANGE UP (ref 0–2)
BILIRUB SERPL-MCNC: 1.3 MG/DL — HIGH (ref 0.2–1.2)
BUN SERPL-MCNC: 19 MG/DL — SIGNIFICANT CHANGE UP (ref 7–23)
CALCIUM SERPL-MCNC: 9.9 MG/DL — SIGNIFICANT CHANGE UP (ref 8.4–10.5)
CHLORIDE SERPL-SCNC: 101 MMOL/L — SIGNIFICANT CHANGE UP (ref 96–108)
CO2 SERPL-SCNC: 26 MMOL/L — SIGNIFICANT CHANGE UP (ref 22–31)
CREAT SERPL-MCNC: 0.67 MG/DL — SIGNIFICANT CHANGE UP (ref 0.5–1.3)
EOSINOPHIL NFR BLD AUTO: 0.9 % — SIGNIFICANT CHANGE UP (ref 0–6)
GLUCOSE SERPL-MCNC: 129 MG/DL — HIGH (ref 70–99)
HCT VFR BLD CALC: 44 % — SIGNIFICANT CHANGE UP (ref 34.5–45)
HGB BLD-MCNC: 14.6 G/DL — SIGNIFICANT CHANGE UP (ref 11.5–15.5)
LYMPHOCYTES # BLD AUTO: 11.2 % — LOW (ref 13–44)
MCHC RBC-ENTMCNC: 29.2 PG — SIGNIFICANT CHANGE UP (ref 27–34)
MCHC RBC-ENTMCNC: 33.2 G/DL — SIGNIFICANT CHANGE UP (ref 32–36)
MCV RBC AUTO: 88 FL — SIGNIFICANT CHANGE UP (ref 80–100)
MONOCYTES NFR BLD AUTO: 5.7 % — SIGNIFICANT CHANGE UP (ref 2–14)
NEUTROPHILS NFR BLD AUTO: 81.9 % — HIGH (ref 43–77)
PLATELET # BLD AUTO: 226 K/UL — SIGNIFICANT CHANGE UP (ref 150–400)
POTASSIUM SERPL-MCNC: 4.7 MMOL/L — SIGNIFICANT CHANGE UP (ref 3.5–5.3)
POTASSIUM SERPL-SCNC: 4.7 MMOL/L — SIGNIFICANT CHANGE UP (ref 3.5–5.3)
PROT SERPL-MCNC: 6.5 G/DL — SIGNIFICANT CHANGE UP (ref 6–8.3)
RBC # BLD: 5 M/UL — SIGNIFICANT CHANGE UP (ref 3.8–5.2)
RBC # FLD: 13.8 % — SIGNIFICANT CHANGE UP (ref 10.3–16.9)
SODIUM SERPL-SCNC: 140 MMOL/L — SIGNIFICANT CHANGE UP (ref 135–145)
WBC # BLD: 13.9 K/UL — HIGH (ref 3.8–10.5)
WBC # FLD AUTO: 13.9 K/UL — HIGH (ref 3.8–10.5)

## 2018-01-05 PROCEDURE — 99284 EMERGENCY DEPT VISIT MOD MDM: CPT | Mod: 25

## 2018-01-05 PROCEDURE — 93010 ELECTROCARDIOGRAM REPORT: CPT

## 2018-01-05 RX ORDER — ONDANSETRON 8 MG/1
4 TABLET, FILM COATED ORAL ONCE
Refills: 0 | Status: DISCONTINUED | OUTPATIENT
Start: 2018-01-05 | End: 2018-01-05

## 2018-01-05 RX ORDER — SODIUM CHLORIDE 9 MG/ML
1000 INJECTION INTRAMUSCULAR; INTRAVENOUS; SUBCUTANEOUS ONCE
Refills: 0 | Status: COMPLETED | OUTPATIENT
Start: 2018-01-05 | End: 2018-01-05

## 2018-01-05 RX ORDER — QUETIAPINE FUMARATE 200 MG/1
25 TABLET, FILM COATED ORAL ONCE
Refills: 0 | Status: COMPLETED | OUTPATIENT
Start: 2018-01-05 | End: 2018-01-05

## 2018-01-05 RX ADMIN — QUETIAPINE FUMARATE 25 MILLIGRAM(S): 200 TABLET, FILM COATED ORAL at 23:03

## 2018-01-05 RX ADMIN — SODIUM CHLORIDE 2000 MILLILITER(S): 9 INJECTION INTRAMUSCULAR; INTRAVENOUS; SUBCUTANEOUS at 23:30

## 2018-01-05 NOTE — ED PROVIDER NOTE - CARE PLAN
Principal Discharge DX:	Urinary tract infection without hematuria, site unspecified  Secondary Diagnosis:	Dementia

## 2018-01-05 NOTE — ED ADULT NURSE NOTE - OBJECTIVE STATEMENT
Patient brought to the ED by HHA and family because she has a hx of dementia and tonight she did not recognise her HHA and thought she was breaking in, began throwing things at her and scratched her and was very upset.  Brought to the ED for medication to calm her down as per HHA.

## 2018-01-05 NOTE — ED PROVIDER NOTE - MEDICAL DECISION MAKING DETAILS
+ UTI w/out sepsis, intitated abx, discussed w/PMD, also initiating trial of seroquel to be followed by PMD. Here with family, discussed plan for continued abx. feel comfortable going home. Pt stable and happy throughout ED stay.

## 2018-01-05 NOTE — ED ADULT TRIAGE NOTE - CHIEF COMPLAINT QUOTE
pt with hx of Dementia, according to her daughter, she did not recognized her aide and fight with her tonight, needs some meds to calm her darlene

## 2018-01-05 NOTE — ED PROVIDER NOTE - OBJECTIVE STATEMENT
81 y/o F w/hx dementia, no other sig pmhx, p/w emotional outburst at home thinking her home health aid was breaking into her apt. Calmed down once her family members arrived, who she recognizes.  Feels well at time of interview. No sx while in the ED. Per family at bedside now at her baseline. Family is concerned as this is the second instance of pt becoming agitated, and she is on no medication for her dementia/mood disturbance/sundowning.

## 2018-01-05 NOTE — ED ADULT NURSE NOTE - NS ED NURSE DC INFO COMPLEXITY
Simple: Patient demonstrates quick and easy understanding/Verbalized Understanding
not applicable (Male)

## 2018-01-06 VITALS
RESPIRATION RATE: 18 BRPM | DIASTOLIC BLOOD PRESSURE: 70 MMHG | TEMPERATURE: 98 F | HEART RATE: 87 BPM | SYSTOLIC BLOOD PRESSURE: 115 MMHG | OXYGEN SATURATION: 99 %

## 2018-01-06 LAB
APPEARANCE UR: CLEAR — SIGNIFICANT CHANGE UP
BILIRUB UR-MCNC: NEGATIVE — SIGNIFICANT CHANGE UP
COLOR SPEC: YELLOW — SIGNIFICANT CHANGE UP
DIFF PNL FLD: (no result)
GLUCOSE UR QL: NEGATIVE — SIGNIFICANT CHANGE UP
KETONES UR-MCNC: NEGATIVE — SIGNIFICANT CHANGE UP
LEUKOCYTE ESTERASE UR-ACNC: (no result)
NITRITE UR-MCNC: NEGATIVE — SIGNIFICANT CHANGE UP
PH UR: 5.5 — SIGNIFICANT CHANGE UP (ref 5–8)
PROT UR-MCNC: NEGATIVE MG/DL — SIGNIFICANT CHANGE UP
SP GR SPEC: 1.01 — SIGNIFICANT CHANGE UP (ref 1–1.03)
UROBILINOGEN FLD QL: 0.2 E.U./DL — SIGNIFICANT CHANGE UP

## 2018-01-06 PROCEDURE — 71045 X-RAY EXAM CHEST 1 VIEW: CPT | Mod: 26

## 2018-01-06 RX ORDER — QUETIAPINE FUMARATE 200 MG/1
1 TABLET, FILM COATED ORAL
Qty: 7 | Refills: 0
Start: 2018-01-06 | End: 2018-01-12

## 2018-01-06 RX ORDER — CEFTRIAXONE 500 MG/1
1 INJECTION, POWDER, FOR SOLUTION INTRAMUSCULAR; INTRAVENOUS EVERY 24 HOURS
Refills: 0 | Status: DISCONTINUED | OUTPATIENT
Start: 2018-01-06 | End: 2018-01-09

## 2018-01-06 RX ORDER — NITROFURANTOIN MACROCRYSTAL 50 MG
1 CAPSULE ORAL
Qty: 20 | Refills: 0
Start: 2018-01-06 | End: 2018-01-15

## 2018-01-06 RX ADMIN — CEFTRIAXONE 100 GRAM(S): 500 INJECTION, POWDER, FOR SOLUTION INTRAMUSCULAR; INTRAVENOUS at 01:25

## 2018-01-07 LAB
CULTURE RESULTS: SIGNIFICANT CHANGE UP
SPECIMEN SOURCE: SIGNIFICANT CHANGE UP

## 2018-11-16 NOTE — DISCHARGE NOTE ADULT - ADDITIONAL INSTRUCTIONS
Please follow up with your primary care doctor, Dr. Abhijeet De La Rosa [phone] within 1 week.    Please follow up with neurosurgery for further evaluation of a possible cerebral aneurysm, please follow up with Dr. Rodriguez or Dr. Oviedo at your earliest convenience. You can call their office to make an appointment. No Please follow up with your primary care doctor, (Dr. Abhijeet De La Rosa [phone]) within 1 week.    Please follow up with neurosurgery for further evaluation of a possible cerebral aneurysm, please follow up with Dr. Rodriguez or Dr. Oviedo at your earliest convenience. You can call their office to make an appointment.

## 2025-04-06 NOTE — ED ADULT NURSE REASSESSMENT NOTE - NS ED NURSE REASSESS COMMENT FT1
Pt denies dizziness or nausea, however reported feeling weakness. MD Lety aware and waiting for admission. back pain general